# Patient Record
Sex: MALE | Race: WHITE | NOT HISPANIC OR LATINO | Employment: OTHER | URBAN - METROPOLITAN AREA
[De-identification: names, ages, dates, MRNs, and addresses within clinical notes are randomized per-mention and may not be internally consistent; named-entity substitution may affect disease eponyms.]

---

## 2017-08-24 ENCOUNTER — RADIANT APPOINTMENT (OUTPATIENT)
Dept: GENERAL RADIOLOGY | Facility: CLINIC | Age: 47
End: 2017-08-24
Attending: FAMILY MEDICINE

## 2017-08-24 ENCOUNTER — OFFICE VISIT (OUTPATIENT)
Dept: FAMILY MEDICINE | Facility: CLINIC | Age: 47
End: 2017-08-24

## 2017-08-24 VITALS
HEART RATE: 62 BPM | BODY MASS INDEX: 30.14 KG/M2 | TEMPERATURE: 97 F | SYSTOLIC BLOOD PRESSURE: 133 MMHG | WEIGHT: 205.6 LBS | DIASTOLIC BLOOD PRESSURE: 84 MMHG

## 2017-08-24 DIAGNOSIS — M79.641 PAIN OF RIGHT HAND: ICD-10-CM

## 2017-08-24 DIAGNOSIS — G56.03 BILATERAL CARPAL TUNNEL SYNDROME: ICD-10-CM

## 2017-08-24 DIAGNOSIS — M77.11 LATERAL EPICONDYLITIS OF RIGHT ELBOW: ICD-10-CM

## 2017-08-24 DIAGNOSIS — M25.531 RIGHT WRIST PAIN: Primary | ICD-10-CM

## 2017-08-24 PROCEDURE — 73130 X-RAY EXAM OF HAND: CPT | Mod: RT

## 2017-08-24 PROCEDURE — 73110 X-RAY EXAM OF WRIST: CPT | Mod: RT

## 2017-08-24 PROCEDURE — 99214 OFFICE O/P EST MOD 30 MIN: CPT | Performed by: FAMILY MEDICINE

## 2017-08-24 RX ORDER — CYCLOBENZAPRINE HCL 5 MG
TABLET ORAL
Qty: 90 TABLET | Refills: 0 | Status: SHIPPED | OUTPATIENT
Start: 2017-08-24

## 2017-08-24 RX ORDER — PREDNISONE 10 MG/1
TABLET ORAL
Qty: 18 TABLET | Refills: 0 | Status: SHIPPED | OUTPATIENT
Start: 2017-08-24

## 2017-08-24 NOTE — MR AVS SNAPSHOT
After Visit Summary   8/24/2017    Tom Matthews    MRN: [id number]           Patient Information     Date Of Birth          1970        Visit Information        Provider Department      8/24/2017 2:40 PM Elba Singer MD [facility]        Today's Diagnoses     Right wrist pain    -  1    Pain of right hand        Lateral epicondylitis of right elbow        Bilateral carpal tunnel syndrome           Follow-ups after your visit        Additional Services     ORTHO  REFERRAL       [facility] is referring you to the Orthopedic  Services at [facility].       The  Representative will assist you in the coordination of your Orthopedic and Musculoskeletal Care as prescribed by your physician.    The  Representative will call you within 1 business day to help schedule your appointment, or you may contact the  Representative at:    All areas ~ [phone]     Type of Referral : Non Surgical       Timeframe requested: routine    Coverage of these services is subject to the terms and limitations of your health insurance plan.  Please call member services at your health plan with any benefit or coverage questions.      If X-rays, CT or MRI's have been performed, please contact the facility where they were done to arrange for , prior to your scheduled appointment.  Please bring this referral request to your appointment and present it to your specialist.                  Who to contact     If you have questions or need follow up information about today's clinic visit or your schedule please contact [facility] directly at [phone].  Normal or non-critical lab and imaging results will be communicated to you by MyChart, letter or phone within 4 business days after the clinic has received the results. If you do not hear from us within 7 days, please contact the clinic through  "Playtabasehart or phone. If you have a critical or abnormal lab result, we will notify you by phone as soon as possible.  Submit refill requests through Keek or call your pharmacy and they will forward the refill request to us. Please allow 3 business days for your refill to be completed.          Additional Information About Your Visit        Playtabase[employer]ITT[employer]LD Information     Keek lets you send messages to your doctor, view your test results, renew your prescriptions, schedule appointments and more. To sign up, go to [email or web address] . Click on \"Log in\" on the left side of the screen, which will take you to the Welcome page. Then click on \"Sign up Now\" on the right side of the page.     You will be asked to enter the access code listed below, as well as some personal information. Please follow the directions to create your username and password.     Your access code is: [email or web address]  Expires: 2017 12:31 AM     Your access code will  in 90 days. If you need help or a new code, please call your [facility] clinic or [phone].        Care EveryWhere ID     This is your Care EveryWhere ID. This could be used by other organizations to access your [facility] medical records  [id number]        Your Vitals Were     Pulse Temperature BMI (Body Mass Index)             62 97  F (36.1  C) (Oral) 30.14 kg/m2          Blood Pressure from Last 3 Encounters:   17 133/84   04/21/15 135/89   04/11/15 120/80    Weight from Last 3 Encounters:   17 205 lb 9.6 oz (93.3 kg)   16 208 lb (94.3 kg)   04/21/15 218 lb (98.9 kg)              We Performed the Following     ORTHO  REFERRAL     XR Hand Right G/E 3 Views     XR Wrist Right G/E 3 Views          Today's Medication Changes          These changes are accurate as of: 17 11:59 PM.  If you have any questions, ask your nurse or doctor.               Start taking these medicines.        Dose/Directions    cyclobenzaprine 5 MG tablet   Commonly known " as:  FLEXERIL   Used for:  Right wrist pain   Started by:  Elba Singer MD        May take 1 or 2 tablets 3 times a day as needed for muscle spasm and pain. Sedating. Preferably take at bedtime   Quantity:  90 tablet   Refills:  0       predniSONE 10 MG tablet   Commonly known as:  DELTASONE   Used for:  Right wrist pain, Pain of right hand   Started by:  Elba Singer MD        10mg/ tablet: 3 tablets daily for the first 3 days then 2 tablets daily for the next 3 days then 1 tablet daily for last 3 days   Quantity:  18 tablet   Refills:  0            Where to get your medicines      These medications were sent to [facility] [address], [address]  [address] [address], [facility] [address]     Phone:  [phone]     cyclobenzaprine 5 MG tablet    predniSONE 10 MG tablet                Primary Care Provider Office Phone # Fax #    Darion Gal Prieto [phone] [address] [facility] [address]        Equal Access to Services     [facility]: Hadii alex ku hadasho Soomaali, waaxda luqadaha, qaybta kaalmada adeegyada, waxay renin hayshama donohue . So [facility] [phone].    ATENCIÓN: Si habla español, tiene a livingston disposición servicios gratuitos de asistencia lingüística. [facility] [phone].    We comply with applicable federal civil rights laws and [place] laws. We do not discriminate on the basis of race, color, national origin, age, disability sex, sexual orientation or gender identity.            Thank you!     Thank you for choosing [facility]  for your care. Our goal is always to provide you with excellent care. Hearing back from our patients is one way we can continue to improve our services. Please take a few minutes to complete the written survey that you may receive in the mail after your visit with us. Thank you!             Your Updated Medication List - Protect others around you:  Learn how to safely use, store and throw away your medicines at www.disposemymeds.org.          This list is accurate as of: 8/24/17 11:59 PM.  Always use your most recent med list.                   Brand Name Dispense Instructions for use Diagnosis    cyclobenzaprine 5 MG tablet    FLEXERIL    90 tablet    May take 1 or 2 tablets 3 times a day as needed for muscle spasm and pain. Sedating. Preferably take at bedtime    Right wrist pain       HYDROcodone-acetaminophen 5-325 MG per tablet    NORCO    25 tablet    Take 1 tablet by mouth every 6 hours as needed for moderate to severe pain    Chronic low back pain       predniSONE 10 MG tablet    DELTASONE    18 tablet    10mg/ tablet: 3 tablets daily for the first 3 days then 2 tablets daily for the next 3 days then 1 tablet daily for last 3 days    Right wrist pain, Pain of right hand

## 2017-08-24 NOTE — PROGRESS NOTES
SUBJECTIVE:   Tom Matthews is a 47 year old male who presents to clinic today for the following health issues:      Musculoskeletal problem/pain      Duration: X 1 day, woke up in middle of night with pain    Description  Location: right hand    Intensity:  severe    Accompanying signs and symptoms: numbness in arm    History  Previous similar problem: no   Previous evaluation:  none    Precipitating or alleviating factors:  Trauma or overuse: POSSIBLE - works with chainsaws all day - heard of pinched nerve  Aggravating factors include: moving hand    Therapies tried and outcome: ice and Ibuprofen    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  Having a hard time moving hand and wrist  Very stiff  No previous surgery     No neck pain  No thoughts of harming self or others     No loss of control of bowel or bladder, no numbness or weakness down the legs or arms  Except for noted above    Problem list and histories reviewed & adjusted, as indicated.  Additional history: as documented    Problem list, Medication list, Allergies, and Medical/Social/Surgical histories reviewed in EPIC and updated as appropriate.    ROS:  Constitutional, HEENT, cardiovascular, pulmonary, gi and gu systems are negative, except as otherwise noted.    OBJECTIVE:                                                    /84  Pulse 62  Temp 97  F (36.1  C) (Oral)  Wt 205 lb 9.6 oz (93.3 kg)  BMI 30.14 kg/m2  Body mass index is 30.14 kg/(m^2).  Awake alert not in any acute cardiorespiratory distress  Psych: pleasant   Neurologic: No gross  neurologic deficits  Skin: no obvious lesions or rashes  Heart: Regular Rate and Rhythm, no murmurs, rubs or gallops  Lungs: Symmetrical Chest expansion, no retractions, clear breath sounds  Musculoskeletal:  Musculoskeletal: Nocervical spine tenderness No neurologic deficits. No sensory deficits or motor deficits both upper and lower extremity   Some mild erythema and swelling of right wrist with pain with range of motion   Also tender right lateral epicondyle.     Affected extremity neurovascularly intact, no cyanosis or edema, good pulses and capillary refill.     Diagnostic Test Results:  No results found for this or any previous visit (from the past 24 hour(s)).     ASSESSMENT/PLAN:                                                        ICD-10-CM    1. Right wrist pain M25.531 XR Hand Right G/E 3 Views     ORTHO  REFERRAL     cyclobenzaprine (FLEXERIL) 5 MG tablet     predniSONE (DELTASONE) 10 MG tablet   2. Pain of right hand M79.641 XR Wrist Right G/E 3 Views     predniSONE (DELTASONE) 10 MG tablet   3. Lateral epicondylitis of right elbow M77.11 ORTHO  REFERRAL   4. Bilateral carpal tunnel syndrome G56.03 ORTHO  REFERRAL       xrays were negative to my review but clinicially still concern for CPPD flare of the right wrist  On discussion of risks vs benefits patient would like to try empiric treatment  With prednisone  Alarm signs or symptoms discussed, if present recommend go to ER   Patient also with lateral epicondylitis. Recommended activity modification  Also recommended wrists splint for bilateral carpal tunnel.   He flat out states he will not do those. But he will follow up with orthopedics  Referred to ortho for follow up  Prescribed with flexeril. Warned sedating  Sedating medications given. Aware not to drive or operate machinery while on these medications. Caution with .   No thoughts of harming self or others  Adverse reactions of medications discussed.  Over  the counter medications discussed.   Aware to come back in if with worsening symptoms or if no relief despite treatment plan  Patient voiced understanding and had no further questions.     MD Elba Chandra MD  [facility]

## 2017-08-24 NOTE — NURSING NOTE
"Chief Complaint   Patient presents with     Musculoskeletal Problem       Initial /84  Pulse 62  Temp 97  F (36.1  C) (Oral)  Wt 205 lb 9.6 oz (93.3 kg)  BMI 30.14 kg/m2 Estimated body mass index is 30.14 kg/(m^2) as calculated from the following:    Height as of 5/24/16: 5' 9.25\" (1.759 m).    Weight as of this encounter: 205 lb 9.6 oz (93.3 kg).  Medication Reconciliation: complete   Lauren Giraldo CMA      "

## 2021-11-15 ENCOUNTER — ANCILLARY PROCEDURE (OUTPATIENT)
Dept: MRI IMAGING | Facility: CLINIC | Age: 51
End: 2021-11-15
Attending: PEDIATRICS

## 2021-11-15 ENCOUNTER — OFFICE VISIT (OUTPATIENT)
Dept: ORTHOPEDICS | Facility: CLINIC | Age: 51
End: 2021-11-15
Payer: OTHER MISCELLANEOUS

## 2021-11-15 VITALS
DIASTOLIC BLOOD PRESSURE: 91 MMHG | HEIGHT: 70 IN | SYSTOLIC BLOOD PRESSURE: 119 MMHG | WEIGHT: 215 LBS | BODY MASS INDEX: 30.78 KG/M2

## 2021-11-15 DIAGNOSIS — S89.92XA INJURY OF LEFT KNEE, INITIAL ENCOUNTER: Primary | ICD-10-CM

## 2021-11-15 DIAGNOSIS — S89.92XA INJURY OF LEFT KNEE, INITIAL ENCOUNTER: ICD-10-CM

## 2021-11-15 DIAGNOSIS — M17.12 ARTHRITIS OF LEFT KNEE: ICD-10-CM

## 2021-11-15 PROCEDURE — 73721 MRI JNT OF LWR EXTRE W/O DYE: CPT | Mod: LT | Performed by: RADIOLOGY

## 2021-11-15 PROCEDURE — 99204 OFFICE O/P NEW MOD 45 MIN: CPT | Performed by: PEDIATRICS

## 2021-11-15 ASSESSMENT — MIFFLIN-ST. JEOR: SCORE: 1826.48

## 2021-11-15 NOTE — PROGRESS NOTES
ASSESSMENT & PLAN    Tom was seen today for pain.    Diagnoses and all orders for this visit:    Injury of left knee, initial encounter  -     XR Knee Standing AP Bilat [place] Bilat Lat Left; Future  -     MR Knee Left w/o Contrast; Future    Arthritis of left knee      This issue is acute and Unchanged.    We discussed these other possible diagnosis: non-displaced fracture, aggravation of underlying arthritis  Does have severe arthritis, however, given acute injury with swelling, concern for possible non displaced fracture. Will obtain MRI, discussed supportive care in the interim.    Plan:  - Today's Plan of Care:  MRI of the Left Knee - Call [phone] to schedule MRI  Knee Immobilizer, Crutches  Continue with relative rest and activity modification, Ice, Compression, and Elevation.  Can apply ice 10-15 minutes 3-4 times per day as needed. OTC medications as needed.    -We also discussed other future treatment options:  Consideration of US Guided procedure (drainage and injection)  Referral to Physical Therapy    Follow Up: will call after reviewing MRI    Concerning signs and symptoms were reviewed.  The patient expressed understanding of this management plan and all questions were answered at this time.    Lauren Sanchez MD [facility]  Sports Medicine Physician  [facility]      -----  Chief Complaint   Patient presents with     Left Knee - Pain       SUBJECTIVE  Tom Matthews is a/an 51 year old male who is seen as a self referral for evaluation of left knee injury.     The patient is seen by themselves.    Onset: 1 day(s) ago. Patient describes injury as direct blow from a log. He was working with a chain saw and when he cut the log it sprung back and hit him directly in the knee  Location of Pain: left knee pain, atnerior  Worsened by: walking, unable to extend or flex  Better with: ace wrap, ice, elevation  Treatments tried: rest/activity avoidance, elevation and ACE wrap, NWB on  "crutches  Associated symptoms: swelling, weakness of knee and feeling of instability    Orthopedic/Surgical history: YES - multiple knee injuries in the past; fractured patella  Social History/Occupation: labor, very physical    No family history pertinent to patient's problem today.    REVIEW OF SYSTEMS:  Review of Systems  Skin: no bruising, yes swelling  Musculoskeletal: as above  Neurologic: no numbness, paresthesias  Remainder of review of systems is negative including constitutional, CV, pulmonary, GI, except as noted in HPI or medical history.    OBJECTIVE:  BP (!) 119/91   Ht 1.778 m (5' 10\")   Wt 97.5 kg (215 lb)   BMI 30.85 kg/m     General: healthy, alert and in no distress  HEENT: no scleral icterus or conjunctival erythema  Skin: no suspicious lesions or rash. No jaundice.  CV: distal perfusion intact  Resp: normal respiratory effort without conversational dyspnea   Psych: normal mood and affect  Gait: antalgic  Neuro: Normal light sensory exam of lower extremity    Left Knee exam    Inspection:      moderate effusion left       moderate swelling left    Patella:      Mobility -       hypomobile left    Tender:      More over joint line, tibial plateau, less around the knee cap    Non Tender:      remainder of knee area left    Knee ROM:      Flexion 90 degrees left       Extension 5 degrees left    Hip ROM:     No pain with hip motion    Strength:      4/5 with knee extension left    Special Tests:     Pain with Loi left       neg (-) anterior drawer left       neg (-) posterior drawer left       neg (-) varus at 0 deg and 30 deg left       neg (-) valgus at 0 deg and 30 deg left    Gait:      antalgic gait    Neurovascular:      2+ peripheral pulses bilaterally and brisk capillary refill       sensation grossly intact    RADIOLOGY:  I independently ordered, visualized and reviewed these images with the patient  AP and sunrise bilateral and left lateral XR views of knees reviewed: no acute bony " abnormality, severe degenerative changes, mostly lateral and patellofemoral  - will follow official read    Review of the result(s) of each unique test - XR

## 2021-11-15 NOTE — LETTER
11/15/2021         RE: Tom Matthews  [address] [place] [address]        Dear Colleague,    Thank you for referring your patient, Tom Matthews, to the [facility]. Please see a copy of my visit note below.    ASSESSMENT & PLAN    Tom was seen today for pain.    Diagnoses and all orders for this visit:    Injury of left knee, initial encounter  -     XR Knee Standing AP Bilat [place] Bilat Lat Left; Future  -     MR Knee Left w/o Contrast; Future    Arthritis of left knee      This issue is acute and Unchanged.    We discussed these other possible diagnosis: non-displaced fracture, aggravation of underlying arthritis  Does have severe arthritis, however, given acute injury with swelling, concern for possible non displaced fracture. Will obtain MRI, discussed supportive care in the interim.    Plan:  - Today's Plan of Care:  MRI of the Left Knee - Call [phone] to schedule MRI  Knee Immobilizer, Crutches  Continue with relative rest and activity modification, Ice, Compression, and Elevation.  Can apply ice 10-15 minutes 3-4 times per day as needed. OTC medications as needed.    -We also discussed other future treatment options:  Consideration of US Guided procedure (drainage and injection)  Referral to Physical Therapy    Follow Up: will call after reviewing MRI    Concerning signs and symptoms were reviewed.  The patient expressed understanding of this management plan and all questions were answered at this time.    Lauren Sanchez MD [facility]  Sports Medicine Physician  [facility]      -----  Chief Complaint   Patient presents with     Left Knee - Pain       SUBJECTIVE  Tom Matthews is a/an 51 year old male who is seen as a self referral for evaluation of left knee injury.     The patient is seen by themselves.    Onset: 1 day(s) ago. Patient describes injury as direct blow from a log. He was working with a chain saw and when he cut  "the log it sprung back and hit him directly in the knee  Location of Pain: left knee pain, atnerior  Worsened by: walking, unable to extend or flex  Better with: ace wrap, ice, elevation  Treatments tried: rest/activity avoidance, elevation and ACE wrap, NWB on crutches  Associated symptoms: swelling, weakness of knee and feeling of instability    Orthopedic/Surgical history: YES - multiple knee injuries in the past; fractured patella  Social History/Occupation: labor, very physical    No family history pertinent to patient's problem today.    REVIEW OF SYSTEMS:  Review of Systems  Skin: no bruising, yes swelling  Musculoskeletal: as above  Neurologic: no numbness, paresthesias  Remainder of review of systems is negative including constitutional, CV, pulmonary, GI, except as noted in HPI or medical history.    OBJECTIVE:  BP (!) 119/91   Ht 1.778 m (5' 10\")   Wt 97.5 kg (215 lb)   BMI 30.85 kg/m     General: healthy, alert and in no distress  HEENT: no scleral icterus or conjunctival erythema  Skin: no suspicious lesions or rash. No jaundice.  CV: distal perfusion intact  Resp: normal respiratory effort without conversational dyspnea   Psych: normal mood and affect  Gait: antalgic  Neuro: Normal light sensory exam of lower extremity    Left Knee exam    Inspection:      moderate effusion left       moderate swelling left    Patella:      Mobility -       hypomobile left    Tender:      More over joint line, tibial plateau, less around the knee cap    Non Tender:      remainder of knee area left    Knee ROM:      Flexion 90 degrees left       Extension 5 degrees left    Hip ROM:     No pain with hip motion    Strength:      4/5 with knee extension left    Special Tests:     Pain with Loi left       neg (-) anterior drawer left       neg (-) posterior drawer left       neg (-) varus at 0 deg and 30 deg left       neg (-) valgus at 0 deg and 30 deg left    Gait:      antalgic gait    Neurovascular:      2+ " peripheral pulses bilaterally and brisk capillary refill       sensation grossly intact    RADIOLOGY:  I independently ordered, visualized and reviewed these images with the patient  AP and sunrise bilateral and left lateral XR views of knees reviewed: no acute bony abnormality, severe degenerative changes, mostly lateral and patellofemoral  - will follow official read    Review of the result(s) of each unique test - XR           Again, thank you for allowing me to participate in the care of your patient.        Sincerely,        Lauren Sanchez MD

## 2021-11-15 NOTE — PATIENT INSTRUCTIONS
We discussed these other possible diagnosis: non-displaced fracture, aggravation of underlying arthritis    Plan:  - Today's Plan of Care:  MRI of the Left Knee - Call [phone] to schedule MRI  Knee Immobilizer, Crutches  Continue with relative rest and activity modification, Ice, Compression, and Elevation.  Can apply ice 10-15 minutes 3-4 times per day as needed. OTC medications as needed.    -We also discussed other future treatment options:  Consideration of US Guided procedure (drainage and injection)  Referral to Physical Therapy    Follow Up: will call after reviewing MRI    If you have any further questions for your physician or physician s care team you can call [phone] and use option 3 to leave a voice message. Calls received during business hours will be returned same day.

## 2021-11-18 ENCOUNTER — OFFICE VISIT (OUTPATIENT)
Dept: ORTHOPEDICS | Facility: CLINIC | Age: 51
End: 2021-11-18
Payer: OTHER MISCELLANEOUS

## 2021-11-18 DIAGNOSIS — M17.12 ARTHRITIS OF LEFT KNEE: Primary | ICD-10-CM

## 2021-11-18 PROCEDURE — 20611 DRAIN/INJ JOINT/BURSA W/US: CPT | Mod: LT | Performed by: FAMILY MEDICINE

## 2021-11-18 RX ADMIN — BETAMETHASONE SODIUM PHOSPHATE AND BETAMETHASONE ACETATE 6 MG: 3; 3 INJECTION, SUSPENSION INTRA-ARTICULAR; INTRALESIONAL; INTRAMUSCULAR; SOFT TISSUE at 16:52

## 2021-11-18 RX ADMIN — ROPIVACAINE HYDROCHLORIDE 3 ML: 5 INJECTION, SOLUTION EPIDURAL; INFILTRATION; PERINEURAL at 16:52

## 2021-11-18 NOTE — PROGRESS NOTES
Large Joint Injection/Arthocentesis: L knee joint    Date/Time: 11/18/2021 4:52 PM  Performed by: Pramod Rudd MD  Authorized by: Pramod Rudd MD     Indications:  Pain and osteoarthritis  Needle Size:  18 G  Guidance: ultrasound    Approach:  Superolateral  Location:  Knee      Medications:  6 mg betamethasone acet & sod phos 6 (3-3) MG/ML; 3 mL ropivacaine 5 MG/ML  Medications comment:  Actual amount of ropivacaine used 4 mL  Aspirate amount (mL):  100  Aspirate:  Yellow and cloudy  Outcome:  Tolerated well, no immediate complications  Procedure discussed: discussed risks, benefits, and alternatives    Consent Given by:  Patient  Timeout: timeout called immediately prior to procedure    Prep: patient was prepped and draped in usual sterile fashion     Referred by Dr. Sanchez     Patient reported some improvement of pain after left knee aspiration and steroid injection.  Aftercare instructions given to patient.  Plan to follow-up as previously discussed with referring provider.     Pramod Rudd MD [facility]

## 2021-11-18 NOTE — PATIENT INSTRUCTIONS
[facility] Injection Discharge Instructions    Procedure: Left knee aspiration/steroid injection      You may shower, however avoid swimming, tub baths or hot tubs for 24 hours following your procedure    You may have a mild to moderate increase in pain for several days following the injection.    It may take up to 14 days for the steroid medication to start working although you may feel the effect as early as a few days after the procedure.    You may use ice packs for 10-15 minutes, 3 to 4 times a day at the injection site for comfort    You may use anti-inflammatory medications (such as Ibuprofen or Aleve or Advil) or Tylenol for pain control if necessary    If you were fasting, you may resume your normal diet and medications after the procedure    If you have diabetes, check your blood sugar more frequently than usual as your blood sugar may be higher than normal for 10-14 days following a steroid injection. Contact your doctor who manages your diabetes if your blood sugar is higher than usual      If you experience any of the following, call [facility] @ [phone] or [phone]  -Fever over 100 degree F  -Swelling, bleeding, redness, drainage, warmth at the injection site  - New or worsening pain    It was great seeing you today!    Pramod Rudd

## 2021-11-18 NOTE — LETTER
11/18/2021         RE: Tom MEMBRENO Cassie  [address] [place] [place] [address]        Dear Colleague,    Thank you for referring your patient, Tom Matthews, to the [facility]. Please see a copy of my visit note below.    Large Joint Injection/Arthocentesis: L knee joint    Date/Time: 11/18/2021 4:52 PM  Performed by: Pramod Rudd MD  Authorized by: Pramod Rudd MD     Indications:  Pain and osteoarthritis  Needle Size:  18 G  Guidance: ultrasound    Approach:  Superolateral  Location:  Knee      Medications:  6 mg betamethasone acet & sod phos 6 (3-3) MG/ML; 3 mL ropivacaine 5 MG/ML  Medications comment:  Actual amount of ropivacaine used 4 mL  Aspirate amount (mL):  100  Aspirate:  Yellow and cloudy  Outcome:  Tolerated well, no immediate complications  Procedure discussed: discussed risks, benefits, and alternatives    Consent Given by:  Patient  Timeout: timeout called immediately prior to procedure    Prep: patient was prepped and draped in usual sterile fashion     Referred by Dr. Sanchez     Patient reported some improvement of pain after left knee aspiration and steroid injection.  Aftercare instructions given to patient.  Plan to follow-up as previously discussed with referring provider.     Pramod Rudd MD [facility]              Again, thank you for allowing me to participate in the care of your patient.        Sincerely,        Pramod Rudd MD

## 2021-11-19 RX ORDER — ROPIVACAINE HYDROCHLORIDE 5 MG/ML
3 INJECTION, SOLUTION EPIDURAL; INFILTRATION; PERINEURAL
Status: SHIPPED | OUTPATIENT
Start: 2021-11-18

## 2021-11-19 RX ORDER — BETAMETHASONE SODIUM PHOSPHATE AND BETAMETHASONE ACETATE 3; 3 MG/ML; MG/ML
6 INJECTION, SUSPENSION INTRA-ARTICULAR; INTRALESIONAL; INTRAMUSCULAR; SOFT TISSUE
Status: SHIPPED | OUTPATIENT
Start: 2021-11-18

## 2021-11-20 NOTE — RESULT ENCOUNTER NOTE
Patient was called and results were reviewed over the phone.    Lauren Sanchez MD CAQ  Primary Care Sports Medicine  [facility]